# Patient Record
Sex: MALE | Race: BLACK OR AFRICAN AMERICAN | NOT HISPANIC OR LATINO | URBAN - METROPOLITAN AREA
[De-identification: names, ages, dates, MRNs, and addresses within clinical notes are randomized per-mention and may not be internally consistent; named-entity substitution may affect disease eponyms.]

---

## 2019-05-04 ENCOUNTER — EMERGENCY (EMERGENCY)
Facility: HOSPITAL | Age: 29
LOS: 1 days | Discharge: HOME | End: 2019-05-04
Attending: EMERGENCY MEDICINE | Admitting: EMERGENCY MEDICINE
Payer: MEDICAID

## 2019-05-04 VITALS
OXYGEN SATURATION: 98 % | SYSTOLIC BLOOD PRESSURE: 136 MMHG | TEMPERATURE: 98 F | DIASTOLIC BLOOD PRESSURE: 82 MMHG | RESPIRATION RATE: 16 BRPM | HEART RATE: 80 BPM

## 2019-05-04 VITALS
TEMPERATURE: 98 F | SYSTOLIC BLOOD PRESSURE: 132 MMHG | OXYGEN SATURATION: 96 % | HEART RATE: 85 BPM | RESPIRATION RATE: 18 BRPM | DIASTOLIC BLOOD PRESSURE: 86 MMHG

## 2019-05-04 LAB
ALBUMIN SERPL ELPH-MCNC: 4.2 G/DL — SIGNIFICANT CHANGE UP (ref 3.4–5)
ALP SERPL-CCNC: 68 U/L — SIGNIFICANT CHANGE UP (ref 40–120)
ALT FLD-CCNC: 77 U/L — HIGH (ref 12–42)
ANION GAP SERPL CALC-SCNC: 6 MMOL/L — LOW (ref 9–16)
APTT BLD: 34.4 SEC — SIGNIFICANT CHANGE UP (ref 27.5–36.3)
AST SERPL-CCNC: 58 U/L — HIGH (ref 15–37)
BILIRUB SERPL-MCNC: 0.4 MG/DL — SIGNIFICANT CHANGE UP (ref 0.2–1.2)
BUN SERPL-MCNC: 15 MG/DL — SIGNIFICANT CHANGE UP (ref 7–23)
CALCIUM SERPL-MCNC: 9.5 MG/DL — SIGNIFICANT CHANGE UP (ref 8.5–10.5)
CHLORIDE SERPL-SCNC: 105 MMOL/L — SIGNIFICANT CHANGE UP (ref 96–108)
CO2 SERPL-SCNC: 30 MMOL/L — SIGNIFICANT CHANGE UP (ref 22–31)
CREAT SERPL-MCNC: 1.03 MG/DL — SIGNIFICANT CHANGE UP (ref 0.5–1.3)
GLUCOSE SERPL-MCNC: 82 MG/DL — SIGNIFICANT CHANGE UP (ref 70–99)
HCT VFR BLD CALC: 42.6 % — SIGNIFICANT CHANGE UP (ref 39–50)
HGB BLD-MCNC: 14.7 G/DL — SIGNIFICANT CHANGE UP (ref 13–17)
INR BLD: 1.03 — SIGNIFICANT CHANGE UP (ref 0.88–1.16)
LACTATE SERPL-SCNC: 1.1 MMOL/L — SIGNIFICANT CHANGE UP (ref 0.4–2)
MCHC RBC-ENTMCNC: 31.8 PG — SIGNIFICANT CHANGE UP (ref 27–34)
MCHC RBC-ENTMCNC: 34.5 G/DL — SIGNIFICANT CHANGE UP (ref 32–36)
MCV RBC AUTO: 92.2 FL — SIGNIFICANT CHANGE UP (ref 80–100)
PLATELET # BLD AUTO: 207 K/UL — SIGNIFICANT CHANGE UP (ref 150–400)
POTASSIUM SERPL-MCNC: 4.3 MMOL/L — SIGNIFICANT CHANGE UP (ref 3.5–5.3)
POTASSIUM SERPL-SCNC: 4.3 MMOL/L — SIGNIFICANT CHANGE UP (ref 3.5–5.3)
PROT SERPL-MCNC: 7.8 G/DL — SIGNIFICANT CHANGE UP (ref 6.4–8.2)
PROTHROM AB SERPL-ACNC: 11.4 SEC — SIGNIFICANT CHANGE UP (ref 10–12.9)
RBC # BLD: 4.62 M/UL — SIGNIFICANT CHANGE UP (ref 4.2–5.8)
RBC # FLD: 11.2 % — SIGNIFICANT CHANGE UP (ref 10.3–14.5)
SODIUM SERPL-SCNC: 141 MMOL/L — SIGNIFICANT CHANGE UP (ref 132–145)
WBC # BLD: 7.2 K/UL — SIGNIFICANT CHANGE UP (ref 3.8–10.5)
WBC # FLD AUTO: 7.2 K/UL — SIGNIFICANT CHANGE UP (ref 3.8–10.5)

## 2019-05-04 PROCEDURE — 99284 EMERGENCY DEPT VISIT MOD MDM: CPT

## 2019-05-04 RX ORDER — MUPIROCIN 20 MG/G
1 OINTMENT TOPICAL
Qty: 2 | Refills: 0
Start: 2019-05-04 | End: 2019-05-10

## 2019-05-04 RX ORDER — AZTREONAM 2 G
1 VIAL (EA) INJECTION
Qty: 14 | Refills: 0
Start: 2019-05-04 | End: 2019-05-10

## 2019-05-04 RX ORDER — VANCOMYCIN HCL 1 G
VIAL (EA) INTRAVENOUS
Refills: 0 | Status: DISCONTINUED | OUTPATIENT
Start: 2019-05-04 | End: 2019-05-04

## 2019-05-04 RX ORDER — VANCOMYCIN HCL 1 G
1500 VIAL (EA) INTRAVENOUS ONCE
Refills: 0 | Status: COMPLETED | OUTPATIENT
Start: 2019-05-04 | End: 2019-05-04

## 2019-05-04 RX ADMIN — Medication 300 MILLIGRAM(S): at 21:58

## 2019-05-04 RX ADMIN — Medication 1 TABLET(S): at 19:48

## 2019-05-04 NOTE — ED PROVIDER NOTE - NSFOLLOWUPINSTRUCTIONS_ED_ALL_ED_FT
Please follow up with Dr Aguirre or your doctor this week.  Please take the antibiotic Bactrim DS one tablet every 12 hours for a week.  Please apply Bactroban cream to the area every 12 hours for a week.  Please return if you do not improve. Please follow up with Dr Aguirre or your doctor this week.  Please take the antibiotic Bactrim DS one tablet every 12 hours for a week.  Please apply Bactroban cream to the area every 12 hours for a week.  Please return if you do not improve.    You must also see your doctor or call [facility] to make an appointment to have your liver function tests checked because they are elevated today.

## 2019-05-04 NOTE — ED PROVIDER NOTE - CLINICAL SUMMARY MEDICAL DECISION MAKING FREE TEXT BOX
29 yo M w/ no pertinent PMHx, allergy to augmentin presents to the ED w/ abscess w/ discharge and pain to left jaw. Pt notes pus draining today; pt took 800mg IBU today with improvement of pain. Denies fever, chills, redness, N/V, HA, dizziness. Pt has had ingrown hair in the past.  Seen by plastic surgeon Dr Aguirre who recommends a dose of IV vanco and a course of PO TMP/SMX

## 2019-05-04 NOTE — ED ADULT TRIAGE NOTE - CHIEF COMPLAINT QUOTE
pt with ingrown hair/abscess to right side of face that is painful/swollen and pustular. denies fevers

## 2019-05-04 NOTE — ED PROVIDER NOTE - CARE PROVIDER_API CALL
Ruth Kenyon)  Plastic Surgery  [address] [place] [address]  Phone: [phone]  Fax: [phone]  Follow Up Time:

## 2019-05-04 NOTE — ED PROVIDER NOTE - OBJECTIVE STATEMENT
29 yo M w/ no pertinent PMHx, allergy to augmentin presents to the ED w/ abscess w/ discharge and pain to left jaw. Pt notes pus draining today; pt took 800mg IBU today with improvement of pain. Denies fever, chills, redness, N/V, HA, dizziness. Pt has had ingrown hair in the past.

## 2020-06-06 ENCOUNTER — TRANSCRIPTION ENCOUNTER (OUTPATIENT)
Age: 30
End: 2020-06-06

## 2021-03-19 ENCOUNTER — APPOINTMENT (OUTPATIENT)
Age: 31
End: 2021-03-19

## 2025-01-06 NOTE — ED PROVIDER NOTE - ENMT, MLM
Avoid any heavy lifting, bending/twisting movements that may aggravate the pain    May apply heat, or ice for 15 to 20 minutes, 3-5 times per day    Take diclofenac twice daily with meals for the next 7-10 days     Do not take any other NSAIDs while taking this medication, such as Advil, Ibuprofen, naproxen, Aleve, etc.     You may take acetaminophen (Tylenol) as needed for additional pain relief, follow package instruction    You may use tizanidine 3 times daily as needed for muscle tightness/spasm/pain.  Do not drive or drink alcohol while taking this medication as it may cause sleepiness    Recommend follow-up with your primary doctor if symptoms do not improve over the next 1 to 2 weeks, sooner if symptoms worsen  
Airway patent, Nasal mucosa clear. Mouth with normal mucosa. Throat has no vesicles, no oropharyngeal exudates and uvula is midline.